# Patient Record
Sex: MALE | Race: OTHER | HISPANIC OR LATINO | URBAN - METROPOLITAN AREA
[De-identification: names, ages, dates, MRNs, and addresses within clinical notes are randomized per-mention and may not be internally consistent; named-entity substitution may affect disease eponyms.]

---

## 2021-02-11 ENCOUNTER — EMERGENCY (EMERGENCY)
Facility: HOSPITAL | Age: 36
LOS: 0 days | Discharge: HOME | End: 2021-02-11
Attending: EMERGENCY MEDICINE

## 2021-02-11 VITALS
HEIGHT: 66 IN | OXYGEN SATURATION: 98 % | WEIGHT: 134.92 LBS | HEART RATE: 88 BPM | TEMPERATURE: 99 F | DIASTOLIC BLOOD PRESSURE: 82 MMHG | RESPIRATION RATE: 16 BRPM | SYSTOLIC BLOOD PRESSURE: 136 MMHG

## 2021-02-11 DIAGNOSIS — R41.82 ALTERED MENTAL STATUS, UNSPECIFIED: ICD-10-CM

## 2021-02-11 DIAGNOSIS — Z53.21 PROCEDURE AND TREATMENT NOT CARRIED OUT DUE TO PATIENT LEAVING PRIOR TO BEING SEEN BY HEALTH CARE PROVIDER: ICD-10-CM

## 2021-02-11 NOTE — ED ADULT NURSE NOTE - CHIEF COMPLAINT QUOTE
Pt Khmer speaking bibems for alcohol intoxication. As per EMS [facility] called them because pt was trying to get into a strangers house. Pt not currently under arrest. Denies pain and offers no complaints at this time.

## 2021-02-11 NOTE — ED ADULT TRIAGE NOTE - CHIEF COMPLAINT QUOTE
Pt Turkmen speaking bibems for alcohol intoxication. As per EMS [facility] called them because pt was trying to get into a strangers house. Pt not currently under arrest. Denies pain and offers no complaints at this time.

## 2021-02-16 NOTE — ED ADULT NURSE NOTE - OBJECTIVE STATEMENT
NBS. Tasklist started. 10lb, MayNanette. Sandrine, can you please send her packet and letter? Thanks! She sounded very distracted... so, we'll see if she remembers anything!  pt eloped prior to being evaluated

## 2025-02-12 NOTE — ED ADULT NURSE NOTE - PRIMARY CARE PROVIDER
Vaccine Information Statement    Hepatitis B Vaccine: What You Need to Know    Many vaccine information statements are available in [place] and other languages. See www.immunize.org/vis.  Hojas de información sobre vacunas están disponibles en español y en muchos otros idiomas. Visite www.immunize.org/vis.    1. Why get vaccinated?    Hepatitis B vaccine can prevent hepatitis B. Hepatitis B is a liver disease that can cause mild illness lasting a few weeks, or it can lead to a serious, lifelong illness.      Acute hepatitis B is a short-term illness that can lead to fever, fatigue, loss of appetite, nausea, vomiting, jaundice (yellow skin or eyes, dark urine, eduardo-colored bowel movements), and pain in the muscles, joints, and stomach.  Chronic hepatitis B is a long-term illness that occurs when the hepatitis B virus remains in a person’s body. Most people who go on to develop chronic hepatitis B do not have symptoms, but it is still very serious and can lead to liver damage (cirrhosis), liver cancer, and death. Chronically infected people can spread hepatitis B virus to others, even if they do not feel or look sick themselves.    Hepatitis B is spread when blood, semen, or other body fluid infected with the hepatitis B virus enters the body of a person who is not infected. People can become infected through:  Birth (if a pregnant person has hepatitis B, their baby can become infected)  Sharing items such as razors or toothbrushes with an infected person  Contact with the blood or open sores of an infected person  Sex with an infected partner  Sharing needles, syringes, or other drug-injection equipment  Exposure to blood from needlesticks or other sharp instruments    Most people who are vaccinated with hepatitis B vaccine are immune for life.     2. Hepatitis B vaccine    Hepatitis B vaccine is usually given as 2, 3, or 4 shots.    Infants should get their first dose of hepatitis B vaccine at birth and will usually  unknown